# Patient Record
Sex: FEMALE | Race: WHITE | NOT HISPANIC OR LATINO | Employment: OTHER | URBAN - METROPOLITAN AREA
[De-identification: names, ages, dates, MRNs, and addresses within clinical notes are randomized per-mention and may not be internally consistent; named-entity substitution may affect disease eponyms.]

---

## 2018-01-02 DIAGNOSIS — R01.1 CARDIAC MURMUR: Primary | ICD-10-CM

## 2018-01-03 ENCOUNTER — HOSPITAL ENCOUNTER (OUTPATIENT)
Dept: CARDIOLOGY | Facility: HOSPITAL | Age: 83
Discharge: HOME | End: 2018-01-03
Attending: INTERNAL MEDICINE
Payer: MEDICARE

## 2018-01-03 DIAGNOSIS — R01.1 CARDIAC MURMUR: ICD-10-CM

## 2018-01-03 LAB
DIASTOLIC DYSFUNCTION: YES
ESTIMATED PA SYSTOLIC PRESSURE: 18.84
RETIRED EF AND QEF - SEE NOTES: 75 (ref 55–65)
TRICUSPID VALVE REGURGITATION: ABNORMAL

## 2018-01-03 PROCEDURE — 93306 TTE W/DOPPLER COMPLETE: CPT | Mod: 26,,, | Performed by: INTERNAL MEDICINE

## 2018-01-03 PROCEDURE — 93306 TTE W/DOPPLER COMPLETE: CPT

## 2018-01-12 ENCOUNTER — OFFICE VISIT (OUTPATIENT)
Dept: CARDIOLOGY | Facility: CLINIC | Age: 83
End: 2018-01-12
Payer: MEDICARE

## 2018-01-12 VITALS
HEIGHT: 60 IN | DIASTOLIC BLOOD PRESSURE: 75 MMHG | WEIGHT: 150.13 LBS | BODY MASS INDEX: 29.48 KG/M2 | SYSTOLIC BLOOD PRESSURE: 133 MMHG | OXYGEN SATURATION: 98 % | HEART RATE: 99 BPM

## 2018-01-12 DIAGNOSIS — I10 ESSENTIAL HYPERTENSION: Primary | ICD-10-CM

## 2018-01-12 DIAGNOSIS — I10 HTN (HYPERTENSION): ICD-10-CM

## 2018-01-12 DIAGNOSIS — E11.8 TYPE 2 DIABETES MELLITUS WITH COMPLICATION, WITH LONG-TERM CURRENT USE OF INSULIN: ICD-10-CM

## 2018-01-12 DIAGNOSIS — E78.00 PURE HYPERCHOLESTEROLEMIA: ICD-10-CM

## 2018-01-12 DIAGNOSIS — Z79.4 TYPE 2 DIABETES MELLITUS WITH COMPLICATION, WITH LONG-TERM CURRENT USE OF INSULIN: ICD-10-CM

## 2018-01-12 PROCEDURE — 99999 PR PBB SHADOW E&M-EST. PATIENT-LVL III: CPT | Mod: PBBFAC,,, | Performed by: INTERNAL MEDICINE

## 2018-01-12 PROCEDURE — 93000 ELECTROCARDIOGRAM COMPLETE: CPT | Mod: S$GLB,,, | Performed by: INTERNAL MEDICINE

## 2018-01-12 PROCEDURE — 99204 OFFICE O/P NEW MOD 45 MIN: CPT | Mod: S$GLB,,, | Performed by: INTERNAL MEDICINE

## 2018-01-12 NOTE — PROGRESS NOTES
Subjective:    Patient ID:  Helen Carvajal is a 82 y.o. female who presents for evaluation of Establish Care      HPI     HTN, DM, HLD, Hypothyroidism    PCP - Roxann    See Dr Diaz for hypothyroidism   Mrs. Carvajal is a  82 yr old woman with diabetes mellitus type 2 uncontrolled complicated with retinopathy and proteinuria is here for follow up .      Current diabetes medications include: Metformin 1000 mg bid. Novolog 20 units + sliding scale (24 units max) , Tresiba 48 units QHS. Sometimes would miss taking in insulin and also she is very concerned of the expense     Pt is on insulin therapy and denies any side effects from insulin. Has lipodystrophy of the lower abdomen.      SMBG: Tests BG 3-4 x /day before each meal  Fasting BG: Mostly in 200 range   - Before lunch and dinner : high 200 and sometimes in 300 range.         Reports no hypoglycemic episodes.  Reports daily hyperglycemic episodes- polydipsia .    Echo 1/3/18    1 - Hyperdynamic left ventricular systolic function (EF >70%).     2 - Concentric remodeling.     3 - Left atrial enlargement.     4 - Impaired LV relaxation, elevated LAP (grade 2 diastolic dysfunction).     5 - Trivial tricuspid regurgitation.     6 - Trivial pulmonic regurgitation.     She is very active for her age  Mild JEFFREY  Denies CP or prior CAD  EKG NSR 97 PRWP        Review of Systems   Constitution: Negative for decreased appetite.   HENT: Negative for ear discharge.    Eyes: Negative for blurred vision.   Respiratory: Negative for hemoptysis.    Endocrine: Negative for polyphagia.   Hematologic/Lymphatic: Negative for adenopathy.   Skin: Negative for color change.   Musculoskeletal: Negative for joint swelling.   Neurological: Negative for brief paralysis.   Psychiatric/Behavioral: Negative for hallucinations.        Objective:    Physical Exam   Constitutional: She is oriented to person, place, and time. She appears well-developed and well-nourished.   HENT:   Head:  Normocephalic and atraumatic.   Eyes: Conjunctivae are normal. Pupils are equal, round, and reactive to light.   Neck: Normal range of motion. Neck supple.   Cardiovascular: Normal rate and intact distal pulses.    Murmur heard.   Early systolic murmur is present with a grade of 2/6   Pulmonary/Chest: Effort normal and breath sounds normal.   Abdominal: Soft. Bowel sounds are normal.   Musculoskeletal: Normal range of motion.   Neurological: She is alert and oriented to person, place, and time.   Skin: Skin is warm and dry.         Assessment:       1. Essential hypertension    2. Pure hypercholesterolemia    3. Type 2 diabetes mellitus with complication, with long-term current use of insulin         Plan:       I suspect her murmur is a flow murmur.  Good EF on echo  Discussed stress testing - will hold off given minimal symptoms. Would consider a stress test for pre-op clearance or progressive symptoms  BP controlled  OV 6 months

## 2020-08-10 ENCOUNTER — HOSPITAL ENCOUNTER (OUTPATIENT)
Dept: RADIOLOGY | Facility: HOSPITAL | Age: 85
Discharge: HOME | End: 2020-08-10
Attending: INTERNAL MEDICINE
Payer: MEDICARE

## 2020-08-10 DIAGNOSIS — R06.02 SHORTNESS OF BREATH: Primary | ICD-10-CM

## 2020-08-10 DIAGNOSIS — R06.02 SHORTNESS OF BREATH: ICD-10-CM

## 2020-08-10 PROCEDURE — 71046 XR CHEST PA AND LATERAL: ICD-10-PCS | Mod: 26,,, | Performed by: RADIOLOGY

## 2020-08-10 PROCEDURE — 71046 X-RAY EXAM CHEST 2 VIEWS: CPT | Mod: 26,,, | Performed by: RADIOLOGY

## 2020-08-10 PROCEDURE — 71046 X-RAY EXAM CHEST 2 VIEWS: CPT | Mod: TC,FY

## 2020-08-14 ENCOUNTER — TELEPHONE (OUTPATIENT)
Dept: PULMONOLOGY | Facility: CLINIC | Age: 85
End: 2020-08-14

## 2020-08-14 ENCOUNTER — TELEPHONE (OUTPATIENT)
Dept: OBSTETRICS AND GYNECOLOGY | Facility: CLINIC | Age: 85
End: 2020-08-14

## 2020-08-14 NOTE — TELEPHONE ENCOUNTER
Attempted to contact patient to schedule appointment with pulmonary/sleep. Received referral from [facility] for patient for SOB.   No answer and no voicemail option.

## 2020-09-14 ENCOUNTER — OFFICE VISIT (OUTPATIENT)
Dept: CARDIOLOGY | Facility: CLINIC | Age: 85
End: 2020-09-14
Payer: MEDICARE

## 2020-09-14 VITALS
WEIGHT: 147.69 LBS | BODY MASS INDEX: 28.99 KG/M2 | DIASTOLIC BLOOD PRESSURE: 85 MMHG | HEIGHT: 60 IN | OXYGEN SATURATION: 98 % | SYSTOLIC BLOOD PRESSURE: 133 MMHG | HEART RATE: 85 BPM

## 2020-09-14 DIAGNOSIS — I10 HYPERTENSION: ICD-10-CM

## 2020-09-14 DIAGNOSIS — Z79.4 TYPE 2 DIABETES MELLITUS WITH COMPLICATION, WITH LONG-TERM CURRENT USE OF INSULIN: ICD-10-CM

## 2020-09-14 DIAGNOSIS — R07.89 CHEST PAIN, ATYPICAL: ICD-10-CM

## 2020-09-14 DIAGNOSIS — I10 ESSENTIAL HYPERTENSION: Primary | ICD-10-CM

## 2020-09-14 DIAGNOSIS — E78.00 PURE HYPERCHOLESTEROLEMIA: ICD-10-CM

## 2020-09-14 DIAGNOSIS — E11.8 TYPE 2 DIABETES MELLITUS WITH COMPLICATION, WITH LONG-TERM CURRENT USE OF INSULIN: ICD-10-CM

## 2020-09-14 DIAGNOSIS — R06.09 DOE (DYSPNEA ON EXERTION): ICD-10-CM

## 2020-09-14 PROCEDURE — 99999 PR PBB SHADOW E&M-EST. PATIENT-LVL III: CPT | Mod: PBBFAC,,, | Performed by: INTERNAL MEDICINE

## 2020-09-14 PROCEDURE — 99214 PR OFFICE/OUTPT VISIT, EST, LEVL IV, 30-39 MIN: ICD-10-PCS | Mod: S$GLB,,, | Performed by: INTERNAL MEDICINE

## 2020-09-14 PROCEDURE — 3079F DIAST BP 80-89 MM HG: CPT | Mod: CPTII,S$GLB,, | Performed by: INTERNAL MEDICINE

## 2020-09-14 PROCEDURE — 1101F PR PT FALLS ASSESS DOC 0-1 FALLS W/OUT INJ PAST YR: ICD-10-PCS | Mod: CPTII,S$GLB,, | Performed by: INTERNAL MEDICINE

## 2020-09-14 PROCEDURE — 1159F MED LIST DOCD IN RCRD: CPT | Mod: S$GLB,,, | Performed by: INTERNAL MEDICINE

## 2020-09-14 PROCEDURE — 93000 EKG 12-LEAD: ICD-10-PCS | Mod: S$GLB,,, | Performed by: INTERNAL MEDICINE

## 2020-09-14 PROCEDURE — 3079F PR MOST RECENT DIASTOLIC BLOOD PRESSURE 80-89 MM HG: ICD-10-PCS | Mod: CPTII,S$GLB,, | Performed by: INTERNAL MEDICINE

## 2020-09-14 PROCEDURE — 1101F PT FALLS ASSESS-DOCD LE1/YR: CPT | Mod: CPTII,S$GLB,, | Performed by: INTERNAL MEDICINE

## 2020-09-14 PROCEDURE — 1126F AMNT PAIN NOTED NONE PRSNT: CPT | Mod: S$GLB,,, | Performed by: INTERNAL MEDICINE

## 2020-09-14 PROCEDURE — 3075F PR MOST RECENT SYSTOLIC BLOOD PRESS GE 130-139MM HG: ICD-10-PCS | Mod: CPTII,S$GLB,, | Performed by: INTERNAL MEDICINE

## 2020-09-14 PROCEDURE — 1126F PR PAIN SEVERITY QUANTIFIED, NO PAIN PRESENT: ICD-10-PCS | Mod: S$GLB,,, | Performed by: INTERNAL MEDICINE

## 2020-09-14 PROCEDURE — 3075F SYST BP GE 130 - 139MM HG: CPT | Mod: CPTII,S$GLB,, | Performed by: INTERNAL MEDICINE

## 2020-09-14 PROCEDURE — 93000 ELECTROCARDIOGRAM COMPLETE: CPT | Mod: S$GLB,,, | Performed by: INTERNAL MEDICINE

## 2020-09-14 PROCEDURE — 99999 PR PBB SHADOW E&M-EST. PATIENT-LVL III: ICD-10-PCS | Mod: PBBFAC,,, | Performed by: INTERNAL MEDICINE

## 2020-09-14 PROCEDURE — 1159F PR MEDICATION LIST DOCUMENTED IN MEDICAL RECORD: ICD-10-PCS | Mod: S$GLB,,, | Performed by: INTERNAL MEDICINE

## 2020-09-14 PROCEDURE — 99214 OFFICE O/P EST MOD 30 MIN: CPT | Mod: S$GLB,,, | Performed by: INTERNAL MEDICINE

## 2020-09-14 NOTE — PROGRESS NOTES
Subjective:    Patient ID:  Helen Carvajal is a 85 y.o. female who presents for follow-up of Hypertension      HPI     HTN, DM, HLD, Hypothyroidism     PCP - Roxann     See Dr Diaz for hypothyroidism   Mrs. Carvajal is a  82 yr old woman with diabetes mellitus type 2 uncontrolled complicated with retinopathy and proteinuria is here for follow up .      Current diabetes medications include: Metformin 1000 mg bid. Novolog 20 units + sliding scale (24 units max) , Tresiba 48 units QHS. Sometimes would miss taking in insulin and also she is very concerned of the expense     Pt is on insulin therapy and denies any side effects from insulin. Has lipodystrophy of the lower abdomen.      SMBG: Tests BG 3-4 x /day before each meal  Fasting BG: Mostly in 200 range   - Before lunch and dinner : high 200 and sometimes in 300 range.         Reports no hypoglycemic episodes.  Reports daily hyperglycemic episodes- polydipsia .     Echo 1/3/18    1 - Hyperdynamic left ventricular systolic function (EF >70%).     2 - Concentric remodeling.     3 - Left atrial enlargement.     4 - Impaired LV relaxation, elevated LAP (grade 2 diastolic dysfunction).     5 - Trivial tricuspid regurgitation.     6 - Trivial pulmonic regurgitation.      1/12/18 She is very active for her age  Mild JEFFREY  Denies CP or prior CAD  EKG NSR 97 PRWP   I suspect her murmur is a flow murmur.  Good EF on echo  Discussed stress testing - will hold off given minimal symptoms. Would consider a stress test for pre-op clearance or progressive symptoms  BP controlled  OV 6 months    9/14/20 Reports worsening JEFFREY with occasional mild chest tightness for the last 3 weeks  EKG NSR PRWP otherwise ok    Review of Systems   Constitution: Negative for decreased appetite.   HENT: Negative for ear discharge.    Eyes: Negative for blurred vision.   Respiratory: Negative for hemoptysis.    Endocrine: Negative for polyphagia.   Hematologic/Lymphatic: Negative for adenopathy.    Skin: Negative for color change.   Musculoskeletal: Negative for joint swelling.   Genitourinary: Negative for bladder incontinence.   Neurological: Negative for brief paralysis.   Psychiatric/Behavioral: Negative for hallucinations.   Allergic/Immunologic: Negative for hives.        Objective:    Physical Exam   Constitutional: She is oriented to person, place, and time. She appears well-developed and well-nourished.   HENT:   Head: Normocephalic and atraumatic.   Eyes: Pupils are equal, round, and reactive to light. Conjunctivae are normal.   Neck: Normal range of motion. Neck supple.   Cardiovascular: Normal rate, normal heart sounds and intact distal pulses.   Pulmonary/Chest: Effort normal and breath sounds normal.   Abdominal: Soft. Bowel sounds are normal.   Musculoskeletal: Normal range of motion.   Neurological: She is alert and oriented to person, place, and time.   Skin: Skin is warm and dry.         Assessment:       1. Essential hypertension    2. JEFFREY (dyspnea on exertion)    3. Pure hypercholesterolemia    4. Type 2 diabetes mellitus with complication, with long-term current use of insulin    5. Chest pain, atypical         Plan:       Echo and lexiscan myoview for JEFFREY and CP

## 2020-09-29 ENCOUNTER — LAB VISIT (OUTPATIENT)
Dept: LAB | Facility: HOSPITAL | Age: 85
End: 2020-09-29
Attending: INTERNAL MEDICINE
Payer: MEDICARE

## 2020-09-29 ENCOUNTER — OFFICE VISIT (OUTPATIENT)
Dept: PULMONOLOGY | Facility: CLINIC | Age: 85
End: 2020-09-29
Payer: MEDICARE

## 2020-09-29 VITALS
HEART RATE: 81 BPM | BODY MASS INDEX: 26.38 KG/M2 | TEMPERATURE: 98 F | WEIGHT: 134.38 LBS | HEIGHT: 60 IN | SYSTOLIC BLOOD PRESSURE: 152 MMHG | DIASTOLIC BLOOD PRESSURE: 72 MMHG | OXYGEN SATURATION: 98 %

## 2020-09-29 DIAGNOSIS — E11.69 TYPE 2 DIABETES MELLITUS WITH OTHER SPECIFIED COMPLICATION, UNSPECIFIED WHETHER LONG TERM INSULIN USE: ICD-10-CM

## 2020-09-29 DIAGNOSIS — R06.09 DYSPNEA ON EXERTION: ICD-10-CM

## 2020-09-29 DIAGNOSIS — R06.09 DYSPNEA ON EXERTION: Primary | ICD-10-CM

## 2020-09-29 DIAGNOSIS — R06.09 DOE (DYSPNEA ON EXERTION): ICD-10-CM

## 2020-09-29 LAB
ALBUMIN SERPL BCP-MCNC: 3.7 G/DL (ref 3.5–5.2)
ALP SERPL-CCNC: 82 U/L (ref 55–135)
ALT SERPL W/O P-5'-P-CCNC: 16 U/L (ref 10–44)
ANION GAP SERPL CALC-SCNC: 11 MMOL/L (ref 8–16)
AST SERPL-CCNC: 15 U/L (ref 10–40)
BASOPHILS # BLD AUTO: 0.06 K/UL (ref 0–0.2)
BASOPHILS NFR BLD: 0.6 % (ref 0–1.9)
BILIRUB SERPL-MCNC: 0.2 MG/DL (ref 0.1–1)
BNP SERPL-MCNC: 22 PG/ML (ref 0–99)
BUN SERPL-MCNC: 18 MG/DL (ref 8–23)
CALCIUM SERPL-MCNC: 9 MG/DL (ref 8.7–10.5)
CHLORIDE SERPL-SCNC: 96 MMOL/L (ref 95–110)
CO2 SERPL-SCNC: 25 MMOL/L (ref 23–29)
CREAT SERPL-MCNC: 1.2 MG/DL (ref 0.5–1.4)
DIFFERENTIAL METHOD: ABNORMAL
EOSINOPHIL # BLD AUTO: 0.2 K/UL (ref 0–0.5)
EOSINOPHIL NFR BLD: 1.4 % (ref 0–8)
ERYTHROCYTE [DISTWIDTH] IN BLOOD BY AUTOMATED COUNT: 14.9 % (ref 11.5–14.5)
EST. GFR  (AFRICAN AMERICAN): 48 ML/MIN/1.73 M^2
EST. GFR  (NON AFRICAN AMERICAN): 42 ML/MIN/1.73 M^2
GLUCOSE SERPL-MCNC: 554 MG/DL (ref 70–110)
HCT VFR BLD AUTO: 31.8 % (ref 37–48.5)
HGB BLD-MCNC: 9.3 G/DL (ref 12–16)
IMM GRANULOCYTES # BLD AUTO: 0.03 K/UL (ref 0–0.04)
IMM GRANULOCYTES NFR BLD AUTO: 0.3 % (ref 0–0.5)
LYMPHOCYTES # BLD AUTO: 3.8 K/UL (ref 1–4.8)
LYMPHOCYTES NFR BLD: 36.3 % (ref 18–48)
MCH RBC QN AUTO: 21.3 PG (ref 27–31)
MCHC RBC AUTO-ENTMCNC: 29.2 G/DL (ref 32–36)
MCV RBC AUTO: 73 FL (ref 82–98)
MONOCYTES # BLD AUTO: 0.8 K/UL (ref 0.3–1)
MONOCYTES NFR BLD: 7.8 % (ref 4–15)
NEUTROPHILS # BLD AUTO: 5.6 K/UL (ref 1.8–7.7)
NEUTROPHILS NFR BLD: 53.6 % (ref 38–73)
NRBC BLD-RTO: 0 /100 WBC
PLATELET # BLD AUTO: 460 K/UL (ref 150–350)
PMV BLD AUTO: 9.6 FL (ref 9.2–12.9)
POTASSIUM SERPL-SCNC: 4.5 MMOL/L (ref 3.5–5.1)
PROT SERPL-MCNC: 7.6 G/DL (ref 6–8.4)
RBC # BLD AUTO: 4.37 M/UL (ref 4–5.4)
SODIUM SERPL-SCNC: 132 MMOL/L (ref 136–145)
WBC # BLD AUTO: 10.47 K/UL (ref 3.9–12.7)

## 2020-09-29 PROCEDURE — 85025 COMPLETE CBC W/AUTO DIFF WBC: CPT

## 2020-09-29 PROCEDURE — 83880 ASSAY OF NATRIURETIC PEPTIDE: CPT

## 2020-09-29 PROCEDURE — 80053 COMPREHEN METABOLIC PANEL: CPT

## 2020-09-29 PROCEDURE — 1101F PT FALLS ASSESS-DOCD LE1/YR: CPT | Mod: CPTII,S$GLB,, | Performed by: INTERNAL MEDICINE

## 2020-09-29 PROCEDURE — 1101F PR PT FALLS ASSESS DOC 0-1 FALLS W/OUT INJ PAST YR: ICD-10-PCS | Mod: CPTII,S$GLB,, | Performed by: INTERNAL MEDICINE

## 2020-09-29 PROCEDURE — 36415 COLL VENOUS BLD VENIPUNCTURE: CPT

## 2020-09-29 PROCEDURE — 3077F PR MOST RECENT SYSTOLIC BLOOD PRESSURE >= 140 MM HG: ICD-10-PCS | Mod: CPTII,S$GLB,, | Performed by: INTERNAL MEDICINE

## 2020-09-29 PROCEDURE — 1159F PR MEDICATION LIST DOCUMENTED IN MEDICAL RECORD: ICD-10-PCS | Mod: S$GLB,,, | Performed by: INTERNAL MEDICINE

## 2020-09-29 PROCEDURE — 1159F MED LIST DOCD IN RCRD: CPT | Mod: S$GLB,,, | Performed by: INTERNAL MEDICINE

## 2020-09-29 PROCEDURE — 3078F PR MOST RECENT DIASTOLIC BLOOD PRESSURE < 80 MM HG: ICD-10-PCS | Mod: CPTII,S$GLB,, | Performed by: INTERNAL MEDICINE

## 2020-09-29 PROCEDURE — 3077F SYST BP >= 140 MM HG: CPT | Mod: CPTII,S$GLB,, | Performed by: INTERNAL MEDICINE

## 2020-09-29 PROCEDURE — 99204 OFFICE O/P NEW MOD 45 MIN: CPT | Mod: S$GLB,,, | Performed by: INTERNAL MEDICINE

## 2020-09-29 PROCEDURE — 3078F DIAST BP <80 MM HG: CPT | Mod: CPTII,S$GLB,, | Performed by: INTERNAL MEDICINE

## 2020-09-29 PROCEDURE — 1125F AMNT PAIN NOTED PAIN PRSNT: CPT | Mod: S$GLB,,, | Performed by: INTERNAL MEDICINE

## 2020-09-29 PROCEDURE — 99999 PR PBB SHADOW E&M-EST. PATIENT-LVL IV: CPT | Mod: PBBFAC,,, | Performed by: INTERNAL MEDICINE

## 2020-09-29 PROCEDURE — 99999 PR PBB SHADOW E&M-EST. PATIENT-LVL IV: ICD-10-PCS | Mod: PBBFAC,,, | Performed by: INTERNAL MEDICINE

## 2020-09-29 PROCEDURE — 99204 PR OFFICE/OUTPT VISIT, NEW, LEVL IV, 45-59 MIN: ICD-10-PCS | Mod: S$GLB,,, | Performed by: INTERNAL MEDICINE

## 2020-09-29 PROCEDURE — 1125F PR PAIN SEVERITY QUANTIFIED, PAIN PRESENT: ICD-10-PCS | Mod: S$GLB,,, | Performed by: INTERNAL MEDICINE

## 2020-09-29 NOTE — LETTER
September 29, 2020      Parker Hackett MD  [address] [facility] [address]  [address]  [place] [address]           [facility]  [address]  [place] [address]  Phone: [phone]  Fax: [phone]          Patient: Helen Carvajal   MR Number: [id number]   YOB: 1935   Date of Visit: 9/29/2020       Dear Dr. Parker Hackett:    Thank you for referring Helen Carvajal to me for evaluation. Attached you will find relevant portions of my assessment and plan of care.    If you have questions, please do not hesitate to call me. I look forward to following Helen Carvajal along with you.    Sincerely,    Margarito Armendariz MD    Enclosure  CC:  No Recipients    If you would like to receive this communication electronically, please contact [email or web address] or [phone] to request more information on [employer] Link access.    For providers and/or their staff who would like to refer a patient to [facility], please contact us through our one-stop-shop provider referral line, [facility], at [phone].    If you feel you have received this communication in error or would no longer like to receive these types of communications, please e-mail [email or web address]

## 2020-09-29 NOTE — PROGRESS NOTES
"  Helen Carvajal  was seen as a new patient at the request  Parker Hackett MD for the evaluation of  sob.    CHIEF COMPLAINT:  Consult and Shortness of Breath      HISTORY OF PRESENT ILLNESS: Helen Carvajal is a 85 y.o. female  has a past medical history of Diabetes mellitus, Hypertension, and Thyroid disease.  Patient with chronic rosa since 4/2020.  Dyspnea is episodic and can associated with minimal exertion or sitting still.  Often described as "cannot catching my breath."  Lasting a minute.  Often resolved spontaneously.  No coughing or wheezing. No palpitation.  No chest pain.  No orthopnea.  No pnd.  No lower extremities.  No syncope.  No dizziness.  Still clean Druze once per month.      PAST MEDICAL HISTORY:    Active Ambulatory Problems     Diagnosis Date Noted    Essential hypertension 01/12/2018    Pure hypercholesterolemia 01/12/2018    DM2 (diabetes mellitus, type 2) 01/12/2018    ROSA (dyspnea on exertion) 09/14/2020    Chest pain, atypical 09/14/2020     Resolved Ambulatory Problems     Diagnosis Date Noted    No Resolved Ambulatory Problems     Past Medical History:   Diagnosis Date    Diabetes mellitus     Hypertension     Thyroid disease                 PAST SURGICAL HISTORY:    Past Surgical History:   Procedure Laterality Date    APPENDECTOMY      HYSTERECTOMY      TONSILLECTOMY           FAMILY HISTORY:                Family History   Problem Relation Age of Onset    Diabetes Mother     Lung cancer Father     Alzheimer's disease Sister     Diabetes Brother     Cancer Brother     Lung cancer Brother     Heart attack Brother        SOCIAL HISTORY:          Tobacco:   Social History     Tobacco Use   Smoking Status Never Smoker     alcohol use:    Social History     Substance and Sexual Activity   Alcohol Use No               Occupation:  Former payroll     ALLERGIES:  Review of patient's allergies indicates:  No Known Allergies    CURRENT MEDICATIONS:    Current " Outpatient Medications   Medication Sig Dispense Refill    amlodipine (NORVASC) 10 MG tablet Take 10 mg by mouth once daily.  0    atorvastatin (LIPITOR) 10 MG tablet   0    BD INSULIN SYRINGE ULTRA-FINE 0.5 mL 31 gauge x 5/16 Syrg   0    enalapril (VASOTEC) 10 MG tablet Take 10 mg by mouth once daily.      levothyroxine (SYNTHROID) 112 MCG tablet take 1 tablet by mouth once daily 30 tablet 11    metformin (GLUCOPHAGE) 1000 MG tablet Take 1,000 mg by mouth 2 (two) times daily.  0    NOVOLOG FLEXPEN 100 unit/mL InPn pen inject 17 UNITS INTO THE SKIN three times a day WITH MEALS 15 mL 11    omeprazole (PRILOSEC) 20 MG capsule   0    penicillin v potassium (VEETID) 500 MG tablet   0    blood sugar diagnostic Strp 1 strip by Misc.(Non-Drug; Combo Route) route 4 (four) times daily before meals and nightly. 120 strip 11    insulin asp prt-insulin aspart, NOVOLOG 70/30, (NOVOLOG MIX 70-30) 100 unit/mL (70-30) Soln Inject 58 Units into the skin 2 (two) times daily before meals. 34.8 mL 11    lancets Misc 1 lancet by Misc.(Non-Drug; Combo Route) route 4 (four) times daily before meals and nightly. 120 each 11     No current facility-administered medications for this visit.                   REVIEW OF SYSTEMS:     Pulmonary related symptoms as per HPI.  Gen:  no weight loss, no fever, no night sweat  HEENT:  no visual changes, no sore throat, + hearing loss  CV:  Per hpi  GI:  no melena, no hematochezia, no diarhea, no constipation.  :  no dysuria, no hematuria, no hesistancy, no dribbling, nocturia  Neuro:  no syncope, no vertigo, no tinitus  Psych:  No homocide or suicide ideation; + depressed limited social restriction from covid 19  Endocrine:  No heat or cold intolerance.  Sleep:  No snoring; no witnessed apnea.  Rested upon awake.    Otherwise, a balance of systems reviewed is negative.          PHYSICAL EXAM:  Vitals:    09/29/20 1446   BP: (!) 152/72   Pulse: 81   Temp: 97.7 °F (36.5 °C)   TempSrc:  Temporal   SpO2: 98%   Weight: 61 kg (134 lb 5.9 oz)   Height: 5' (1.524 m)   PainSc:   2   PainLoc: Shoulder     Body mass index is 26.24 kg/m².     GENERAL:  well develop; no apparent distress  HEENT:  no nasal congestion; no discharge noted; class 2 modified mallampatti.   NECK:  supple; no palpable masses.  CARDIO: regular rate and rhythm; +MICHAEL at left parasternal region.  PULM:  clear to auscultation bilaterally; no intercostals retractions; no accessory muscle usage   ABDOMEN:  soft nontender/nondistended.  +bowel sound  EXTREMITIES no cce  NEURO:  CN II-XII intact.  5/5 motor in all extremities.  sensation grossly intact   to light touch.  PSYCH:  normal affect.  Alert and oriented x 4    LABS  Pulmonary Functions Testing Results(personally reviewed):  none  ABG (personally reviewed):  none  CXR (personally reviewed):  8/10/20 no consolidation or effusion  CT CHEST(personally reviewed):  none    Echo 1/3/18  CONCLUSIONS     1 - Hyperdynamic left ventricular systolic function (EF >70%).     2 - Concentric remodeling.     3 - Left atrial enlargement.     4 - Impaired LV relaxation, elevated LAP (grade 2 diastolic dysfunction).     5 - Trivial tricuspid regurgitation.     6 - Trivial pulmonic regurgitation.     ASSESSMENT/PLAN  Problem List Items Addressed This Visit     DM2 (diabetes mellitus, type 2)    Overview     Hyperglycemia above 500.  Hyperglycemia can to some decree explained intermittent dizziness.  Most likely due to dehydration.  D/w son and advise to follow up with endo.  Son stated that has not helpful in the past.  Advise son to follow up with pcp         JEFFREY (dyspnea on exertion)    Overview     Intermittent and transient.  Etiology unclear.   Dyspnea seem to begin at the height of social restriction a/w covid 19 suggesting a  psychogenic component.  Patient admits to feeling restless from being idle at home.  Blood work revealed uncontrolled dm2.   Echo with diastolic dysfunction.             Other Visit Diagnoses     Dyspnea on exertion    -  Primary    Relevant Orders    Complete PFT with bronchodilator    PULM - Arterial Blood Gases--in addition to PFT only    COVID-19 Routine Screening    CBC auto differential (Completed)    Brain Natriuretic Peptide (Completed)    Comprehensive metabolic panel (Completed)        Nasal congestion - continue with nasal steroid.    Patient will No follow-ups on file. with md/np.    CC: Send copy of this note to Parker Hackett MD

## 2020-10-01 PROBLEM — E11.9 DM2 (DIABETES MELLITUS, TYPE 2): Status: ACTIVE | Noted: 2018-01-12

## 2020-10-03 ENCOUNTER — LAB VISIT (OUTPATIENT)
Dept: URGENT CARE | Facility: CLINIC | Age: 85
End: 2020-10-03
Payer: MEDICARE

## 2020-10-03 DIAGNOSIS — R06.09 DYSPNEA ON EXERTION: ICD-10-CM

## 2020-10-03 PROCEDURE — 99211 PR OFFICE/OUTPT VISIT, EST, LEVL I: ICD-10-PCS | Mod: S$GLB,,, | Performed by: NURSE PRACTITIONER

## 2020-10-03 PROCEDURE — U0003 INFECTIOUS AGENT DETECTION BY NUCLEIC ACID (DNA OR RNA); SEVERE ACUTE RESPIRATORY SYNDROME CORONAVIRUS 2 (SARS-COV-2) (CORONAVIRUS DISEASE [COVID-19]), AMPLIFIED PROBE TECHNIQUE, MAKING USE OF HIGH THROUGHPUT TECHNOLOGIES AS DESCRIBED BY CMS-2020-01-R: HCPCS

## 2020-10-03 PROCEDURE — 99211 OFF/OP EST MAY X REQ PHY/QHP: CPT | Mod: S$GLB,,, | Performed by: NURSE PRACTITIONER

## 2020-10-04 LAB — SARS-COV-2 RNA RESP QL NAA+PROBE: NOT DETECTED

## 2020-10-06 ENCOUNTER — TELEPHONE (OUTPATIENT)
Dept: PULMONOLOGY | Facility: CLINIC | Age: 85
End: 2020-10-06

## 2020-10-06 NOTE — TELEPHONE ENCOUNTER
Spoke with patient son and rescheduled PFT  ----- Message from Quinten Wood sent at 10/6/2020 11:52 AM CDT -----  Type:  Patient Returning Call    Who Called: Pedro (Son)    Who Left Message for Patient:  Katharine    Does the patient know what this is regarding?: Yes    Would the patient rather a call back or a response via [facility]?  Call back    Best Call Back Number:  [phone]     Thank you.

## 2020-10-06 NOTE — TELEPHONE ENCOUNTER
Left voicemail to inform patient that PFt was missed and due to COVID test being time sensitive the patient needs appt to be done today or tomorrow.       ----- Message from Tamar James RRT sent at 10/6/2020 11:13 AM CDT -----  This patient was a no show for her appointment this morning.  Thanks

## 2020-10-07 ENCOUNTER — TELEPHONE (OUTPATIENT)
Dept: PULMONOLOGY | Facility: CLINIC | Age: 85
End: 2020-10-07

## 2020-10-07 ENCOUNTER — HOSPITAL ENCOUNTER (OUTPATIENT)
Dept: RESPIRATORY THERAPY | Facility: HOSPITAL | Age: 85
Discharge: HOME | End: 2020-10-07
Attending: INTERNAL MEDICINE
Payer: MEDICARE

## 2020-10-07 DIAGNOSIS — R06.09 DYSPNEA ON EXERTION: ICD-10-CM

## 2020-10-07 LAB
ALLENS TEST: NORMAL
DELSYS: NORMAL
HCO3 UR-SCNC: 24.6 MMOL/L (ref 24–28)
MODE: NORMAL
PCO2 BLDA: 37.5 MMHG (ref 35–45)
PH SMN: 7.43 [PH] (ref 7.35–7.45)
PO2 BLDA: 90 MMHG (ref 80–100)
POC BE: 0 MMOL/L
POC SATURATED O2: 97 % (ref 95–100)
POC TCO2: 26 MMOL/L (ref 23–27)
SAMPLE: NORMAL
SITE: NORMAL

## 2020-10-07 PROCEDURE — 94729 DIFFUSING CAPACITY: CPT

## 2020-10-07 PROCEDURE — 94010 BREATHING CAPACITY TEST: CPT

## 2020-10-07 PROCEDURE — 94727 GAS DIL/WSHOT DETER LNG VOL: CPT

## 2020-10-07 RX ORDER — ALBUTEROL SULFATE 2.5 MG/.5ML
2.5 SOLUTION RESPIRATORY (INHALATION) ONCE
Status: DISCONTINUED | OUTPATIENT
Start: 2020-10-07 | End: 2020-10-07

## 2020-10-07 NOTE — PROCEDURES
PFT completed but not of any accuracy because patient could not comprehend what was needed of her to get accurate testing. I tried demonstrating and instructing numerous times with no good results. Patient did try to give best efforts.

## 2020-10-07 NOTE — TELEPHONE ENCOUNTER
Result of pft, abg, and blood work d/w son, Pranay.  Per my conversation on 9/30 and again today, I stressed the importance of follow up with pcp for hyperglycemia.  No evidence of parenchymal lung disease on cxr and abg.  Unable to cooperate with pft.  Son expressed understanding.